# Patient Record
Sex: FEMALE | Race: WHITE | NOT HISPANIC OR LATINO | URBAN - METROPOLITAN AREA
[De-identification: names, ages, dates, MRNs, and addresses within clinical notes are randomized per-mention and may not be internally consistent; named-entity substitution may affect disease eponyms.]

---

## 2019-02-03 ENCOUNTER — EMERGENCY (EMERGENCY)
Facility: HOSPITAL | Age: 29
LOS: 1 days | Discharge: HOME | End: 2019-02-03
Attending: STUDENT IN AN ORGANIZED HEALTH CARE EDUCATION/TRAINING PROGRAM
Payer: MEDICAID

## 2019-02-03 VITALS
DIASTOLIC BLOOD PRESSURE: 70 MMHG | HEIGHT: 64 IN | SYSTOLIC BLOOD PRESSURE: 106 MMHG | WEIGHT: 134.04 LBS | HEART RATE: 84 BPM | OXYGEN SATURATION: 100 % | TEMPERATURE: 98 F | RESPIRATION RATE: 18 BRPM

## 2019-02-03 VITALS
HEART RATE: 76 BPM | DIASTOLIC BLOOD PRESSURE: 76 MMHG | RESPIRATION RATE: 20 BRPM | OXYGEN SATURATION: 97 % | TEMPERATURE: 97 F | SYSTOLIC BLOOD PRESSURE: 118 MMHG

## 2019-02-03 LAB
ALBUMIN SERPL ELPH-MCNC: 4.3 G/DL — SIGNIFICANT CHANGE UP (ref 3.3–5.2)
ALP SERPL-CCNC: 49 U/L — SIGNIFICANT CHANGE UP (ref 40–120)
ALT FLD-CCNC: 15 U/L — SIGNIFICANT CHANGE UP
AMPHET UR-MCNC: NEGATIVE — SIGNIFICANT CHANGE UP
ANION GAP SERPL CALC-SCNC: 12 MMOL/L — SIGNIFICANT CHANGE UP (ref 5–17)
APAP SERPL-MCNC: <7.5 UG/ML — LOW (ref 10–26)
APPEARANCE UR: CLEAR — SIGNIFICANT CHANGE UP
AST SERPL-CCNC: 23 U/L — SIGNIFICANT CHANGE UP
BARBITURATES UR SCN-MCNC: NEGATIVE — SIGNIFICANT CHANGE UP
BASOPHILS # BLD AUTO: 0 K/UL — SIGNIFICANT CHANGE UP (ref 0–0.2)
BASOPHILS NFR BLD AUTO: 0.2 % — SIGNIFICANT CHANGE UP (ref 0–2)
BENZODIAZ UR-MCNC: NEGATIVE — SIGNIFICANT CHANGE UP
BILIRUB SERPL-MCNC: 0.3 MG/DL — LOW (ref 0.4–2)
BILIRUB UR-MCNC: NEGATIVE — SIGNIFICANT CHANGE UP
BUN SERPL-MCNC: 12 MG/DL — SIGNIFICANT CHANGE UP (ref 8–20)
CALCIUM SERPL-MCNC: 8.9 MG/DL — SIGNIFICANT CHANGE UP (ref 8.6–10.2)
CHLORIDE SERPL-SCNC: 104 MMOL/L — SIGNIFICANT CHANGE UP (ref 98–107)
CO2 SERPL-SCNC: 24 MMOL/L — SIGNIFICANT CHANGE UP (ref 22–29)
COCAINE METAB.OTHER UR-MCNC: NEGATIVE — SIGNIFICANT CHANGE UP
COLOR SPEC: YELLOW — SIGNIFICANT CHANGE UP
CREAT SERPL-MCNC: 0.66 MG/DL — SIGNIFICANT CHANGE UP (ref 0.5–1.3)
DIFF PNL FLD: NEGATIVE — SIGNIFICANT CHANGE UP
EOSINOPHIL # BLD AUTO: 0 K/UL — SIGNIFICANT CHANGE UP (ref 0–0.5)
EOSINOPHIL NFR BLD AUTO: 0.5 % — SIGNIFICANT CHANGE UP (ref 0–6)
ETHANOL SERPL-MCNC: 110 MG/DL — SIGNIFICANT CHANGE UP
GLUCOSE SERPL-MCNC: 105 MG/DL — SIGNIFICANT CHANGE UP (ref 70–115)
GLUCOSE UR QL: NEGATIVE MG/DL — SIGNIFICANT CHANGE UP
HCG SERPL-ACNC: <4 MIU/ML — SIGNIFICANT CHANGE UP
HCT VFR BLD CALC: 38.6 % — SIGNIFICANT CHANGE UP (ref 37–47)
HGB BLD-MCNC: 12.9 G/DL — SIGNIFICANT CHANGE UP (ref 12–16)
KETONES UR-MCNC: NEGATIVE — SIGNIFICANT CHANGE UP
LEUKOCYTE ESTERASE UR-ACNC: NEGATIVE — SIGNIFICANT CHANGE UP
LYMPHOCYTES # BLD AUTO: 1.4 K/UL — SIGNIFICANT CHANGE UP (ref 1–4.8)
LYMPHOCYTES # BLD AUTO: 21.6 % — SIGNIFICANT CHANGE UP (ref 20–55)
MCHC RBC-ENTMCNC: 29.2 PG — SIGNIFICANT CHANGE UP (ref 27–31)
MCHC RBC-ENTMCNC: 33.4 G/DL — SIGNIFICANT CHANGE UP (ref 32–36)
MCV RBC AUTO: 87.3 FL — SIGNIFICANT CHANGE UP (ref 81–99)
METHADONE UR-MCNC: NEGATIVE — SIGNIFICANT CHANGE UP
MONOCYTES # BLD AUTO: 0.3 K/UL — SIGNIFICANT CHANGE UP (ref 0–0.8)
MONOCYTES NFR BLD AUTO: 5.3 % — SIGNIFICANT CHANGE UP (ref 3–10)
NEUTROPHILS # BLD AUTO: 4.5 K/UL — SIGNIFICANT CHANGE UP (ref 1.8–8)
NEUTROPHILS NFR BLD AUTO: 72.2 % — SIGNIFICANT CHANGE UP (ref 37–73)
NITRITE UR-MCNC: NEGATIVE — SIGNIFICANT CHANGE UP
OPIATES UR-MCNC: NEGATIVE — SIGNIFICANT CHANGE UP
PCP SPEC-MCNC: SIGNIFICANT CHANGE UP
PCP UR-MCNC: NEGATIVE — SIGNIFICANT CHANGE UP
PH UR: 7 — SIGNIFICANT CHANGE UP (ref 5–8)
PLATELET # BLD AUTO: 195 K/UL — SIGNIFICANT CHANGE UP (ref 150–400)
POTASSIUM SERPL-MCNC: 3.8 MMOL/L — SIGNIFICANT CHANGE UP (ref 3.5–5.3)
POTASSIUM SERPL-SCNC: 3.8 MMOL/L — SIGNIFICANT CHANGE UP (ref 3.5–5.3)
PROT SERPL-MCNC: 7 G/DL — SIGNIFICANT CHANGE UP (ref 6.6–8.7)
PROT UR-MCNC: NEGATIVE MG/DL — SIGNIFICANT CHANGE UP
RBC # BLD: 4.42 M/UL — SIGNIFICANT CHANGE UP (ref 4.4–5.2)
RBC # FLD: 12 % — SIGNIFICANT CHANGE UP (ref 11–15.6)
SALICYLATES SERPL-MCNC: <0.6 MG/DL — LOW (ref 10–20)
SODIUM SERPL-SCNC: 140 MMOL/L — SIGNIFICANT CHANGE UP (ref 135–145)
SP GR SPEC: 1 — LOW (ref 1.01–1.02)
THC UR QL: NEGATIVE — SIGNIFICANT CHANGE UP
TSH SERPL-MCNC: 0.68 UIU/ML — SIGNIFICANT CHANGE UP (ref 0.27–4.2)
UROBILINOGEN FLD QL: NEGATIVE MG/DL — SIGNIFICANT CHANGE UP
WBC # BLD: 6.2 K/UL — SIGNIFICANT CHANGE UP (ref 4.8–10.8)
WBC # FLD AUTO: 6.2 K/UL — SIGNIFICANT CHANGE UP (ref 4.8–10.8)

## 2019-02-03 PROCEDURE — 36415 COLL VENOUS BLD VENIPUNCTURE: CPT

## 2019-02-03 PROCEDURE — 80307 DRUG TEST PRSMV CHEM ANLYZR: CPT

## 2019-02-03 PROCEDURE — 80053 COMPREHEN METABOLIC PANEL: CPT

## 2019-02-03 PROCEDURE — 93010 ELECTROCARDIOGRAM REPORT: CPT

## 2019-02-03 PROCEDURE — 99284 EMERGENCY DEPT VISIT MOD MDM: CPT | Mod: 25

## 2019-02-03 PROCEDURE — 81003 URINALYSIS AUTO W/O SCOPE: CPT

## 2019-02-03 PROCEDURE — 99285 EMERGENCY DEPT VISIT HI MDM: CPT

## 2019-02-03 PROCEDURE — 84702 CHORIONIC GONADOTROPIN TEST: CPT

## 2019-02-03 PROCEDURE — 84443 ASSAY THYROID STIM HORMONE: CPT

## 2019-02-03 PROCEDURE — 93005 ELECTROCARDIOGRAM TRACING: CPT

## 2019-02-03 PROCEDURE — 85027 COMPLETE CBC AUTOMATED: CPT

## 2019-02-03 NOTE — ED PROVIDER NOTE - OBJECTIVE STATEMENT
29 y/o F, with hx of anxiety, depression, bipolar disorder, and seizures, BIBA to the ED for alcohol intoxication.  Pt states "I drank too much".  Admits to social ETOH use.  Associated sx include nausea and multiple episodes of non bilious, non bloody emesis.  Pt also states that she was crying PTA to the ED because she got into an argument with her boyfriend.  Currently on Wellbutrin.  Pt states that she would like to be evaluated by a psychiatrist due to her hx of bipolar disorder. 29 y/o F, with hx of anxiety, depression, bipolar disorder, and seizures, BIBA to the ED for alcohol intoxication.  Pt states "I drank too much".  Admits to social ETOH use.  Associated sx include nausea and multiple episodes of non bilious, non bloody emesis.  Pt also states that she was crying PTA to the ED because she got into an argument with her boyfriend.  Currently on Wellbutrin.  Pt states that she would like to be evaluated by a psychiatrist due to her hx of bipolar disorder.  Denies SI, HI, visual or auditory hallucinations. Pt denies fevers/chills, ha, loc, focal neuro deficits, cp/sob/palp, cough, abd pain/d, urinary symptoms, recent travel and sick contacts.

## 2019-02-03 NOTE — ED PROVIDER NOTE - PMH
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

## 2019-02-03 NOTE — ED PROVIDER NOTE - MEDICAL DECISION MAKING DETAILS
29 y/o F, with hx of anxiety, depression, bipolar disorder, and seizures, BIBA to the ED for alcohol intoxication. 27 y/o F, with hx of anxiety, depression, bipolar disorder, and seizures, BIBA to the ED for alcohol intoxication - pt clinically sober, no metabolic abn, stable for dc

## 2019-02-03 NOTE — ED PROVIDER NOTE - PROGRESS NOTE DETAILS
Pt reassessed and states she no longer wants to be seen evaluated by psych she has no SI/HI/VH/AH   Patient seen and reassessed.   Patient is AAOX3, NAD, able to ambulate, non tremulous, and tolerating oral intake.   VSS.   Patient states that they feel safe going home and have a safe way to get home.   Give copy of tests performed.   Discussed need to cut down on alcohol intake, given literature.

## 2019-02-03 NOTE — ED ADULT TRIAGE NOTE - CHIEF COMPLAINT QUOTE
patient arrived via ambulance - patient loud. states that she drank too much because she had too fun states that she is on wellbutrin at this time. patient states that she lives with boyfriend on weekend and that her parents don't care about her and she is in the hospital because she cannot tolerate herself when she is drinking

## 2019-02-03 NOTE — ED PROVIDER NOTE - CONSTITUTIONAL, MLM
normal... Intoxicated appearing, ALOOB, tangential at time, well nourished, awake, alert, oriented to person, place, time/situation and in no apparent distress.

## 2020-11-18 PROBLEM — F41.9 ANXIETY DISORDER, UNSPECIFIED: Chronic | Status: ACTIVE | Noted: 2019-02-03

## 2020-11-18 PROBLEM — F31.9 BIPOLAR DISORDER, UNSPECIFIED: Chronic | Status: ACTIVE | Noted: 2019-02-03

## 2020-12-29 ENCOUNTER — APPOINTMENT (OUTPATIENT)
Dept: RHEUMATOLOGY | Facility: CLINIC | Age: 30
End: 2020-12-29

## 2022-01-04 NOTE — ED PROVIDER NOTE - DISCHARGE DATE
Would you like to use one of your approval req spots on Friday, keep in mind she uses the  so a little extra time is needed usually   03-Feb-2019

## 2022-04-19 PROBLEM — Z00.00 ENCOUNTER FOR PREVENTIVE HEALTH EXAMINATION: Status: ACTIVE | Noted: 2022-04-19

## 2022-04-20 ENCOUNTER — NON-APPOINTMENT (OUTPATIENT)
Age: 32
End: 2022-04-20

## 2022-04-20 ENCOUNTER — APPOINTMENT (OUTPATIENT)
Dept: RHEUMATOLOGY | Facility: CLINIC | Age: 32
End: 2022-04-20
Payer: COMMERCIAL

## 2022-04-20 ENCOUNTER — LABORATORY RESULT (OUTPATIENT)
Age: 32
End: 2022-04-20

## 2022-04-20 VITALS
HEART RATE: 81 BPM | OXYGEN SATURATION: 98 % | WEIGHT: 165 LBS | TEMPERATURE: 97.5 F | BODY MASS INDEX: 28.17 KG/M2 | SYSTOLIC BLOOD PRESSURE: 110 MMHG | DIASTOLIC BLOOD PRESSURE: 78 MMHG | HEIGHT: 64 IN

## 2022-04-20 PROCEDURE — 99204 OFFICE O/P NEW MOD 45 MIN: CPT | Mod: 25

## 2022-04-20 NOTE — HISTORY OF PRESENT ILLNESS
[FreeTextEntry1] : 30 y/o female referred to rheumatology for leg swelling.\par Pt reports pains in the b/l ankles that shoots up to to the neck.\par Pt has Hx of L ankle fracture in childhood.\par Pt has been having b/l LE swelling worsens when she is walking for a long time or standing for a long time. Pt has feet pain with swelling with and without swelling. Swelling can last up to few weeks. Pt has flat feet. Denies other joint pains. Pt reportedly has normal kidney and liver functions.\par Pt takes Tylenol PRN and naproxen PRN for the pain which helps with pain but not with the swelling. Pt had b/l LE without blood clots.\par Reports dry eyes worse during maciel.\par Mother - RA\par \par Patient denies fatigue, fever, nasopharyngeal ulcers, chest pain, abdominal pain, palpitations, cough, SOB, nausea, vomiting, diarrhea, blood in stool, dysuria, hematuria, rash,Raynaud's, dry mouth, numbness/tingling, miscarriages (never been pregnant), Hx of DVT/PEs.\par ROS negative unless otherwise noted above.\par \par Pt has been on lamotrigene

## 2022-04-20 NOTE — ASSESSMENT
[FreeTextEntry1] : 32 y/o female referred to rheumatology for leg swelling.\par Pt reports pains in the b/l ankles that shoots up to to the neck.\par Pt has Hx of L ankle fracture in childhood.\par Pt has been having b/l LE swelling worsens when she is walking for a long time or standing for a long time. Pt has feet pain with swelling with and without swelling. Swelling can last up to few weeks. Pt has flat feet. Denies other joint pains. Pt reportedly has normal kidney and liver functions.\par Pt takes Tylenol PRN and naproxen PRN for the pain which helps with pain but not with the swelling. Pt had b/l LE without blood clots.\par Reports dry eyes worse during maciel.\par Mother - RA\par Pt has been on lamotrigene for several months as only new medication.\par \par Pt has b/l LE swelling with associated pain worse with standing and walking and which is up to her b/l knees.\par Pt's symptoms does not sound like inflammatory arthritis and much more like fluid retention with associated pain.\par Most common causes of LE swelling are cardiac, renal, liver, medication side effects. Pt does not have inflammatory arthritis or other concerning systemic symptoms and I have low suspicion for inflammatory arthritis such as RA.\par \par - Obtain labwork to evaluate for inflammatory arthritis and causes of LE fluid retention\par - Obtain XR b/l ankles and feet\par - Obtain TTE to evaluate cardiac function in setting of LE swelling\par - Advised to discuss with psychiatrist about any side effect of lamotrigene which can lead to water retention\par - Will contact pt with any concerning findings. RTC 2 months for follow up\par

## 2022-04-21 LAB
ALBUMIN SERPL ELPH-MCNC: 4.8 G/DL
ALP BLD-CCNC: 62 U/L
ALT SERPL-CCNC: 13 U/L
ANION GAP SERPL CALC-SCNC: 12 MMOL/L
AST SERPL-CCNC: 17 U/L
BASOPHILS # BLD AUTO: 0.02 K/UL
BASOPHILS NFR BLD AUTO: 0.5 %
BILIRUB SERPL-MCNC: 0.3 MG/DL
BUN SERPL-MCNC: 12 MG/DL
C3 SERPL-MCNC: 118 MG/DL
C4 SERPL-MCNC: 31 MG/DL
CALCIUM SERPL-MCNC: 9.9 MG/DL
CCP AB SER IA-ACNC: <8 UNITS
CHLORIDE SERPL-SCNC: 101 MMOL/L
CO2 SERPL-SCNC: 26 MMOL/L
CREAT SERPL-MCNC: 0.62 MG/DL
CREAT SPEC-SCNC: 41 MG/DL
CREAT/PROT UR: 0.3 RATIO
CRP SERPL-MCNC: <3 MG/L
DSDNA AB SER-ACNC: <12 IU/ML
EGFR: 122 ML/MIN/1.73M2
EOSINOPHIL # BLD AUTO: 0.09 K/UL
EOSINOPHIL NFR BLD AUTO: 2 %
ERYTHROCYTE [SEDIMENTATION RATE] IN BLOOD BY WESTERGREN METHOD: 17 MM/HR
GLUCOSE SERPL-MCNC: 92 MG/DL
HCT VFR BLD CALC: 43.8 %
HGB BLD-MCNC: 14 G/DL
IMM GRANULOCYTES NFR BLD AUTO: 0.2 %
LYMPHOCYTES # BLD AUTO: 1.15 K/UL
LYMPHOCYTES NFR BLD AUTO: 26 %
MAN DIFF?: NORMAL
MCHC RBC-ENTMCNC: 28.3 PG
MCHC RBC-ENTMCNC: 32 GM/DL
MCV RBC AUTO: 88.7 FL
MONOCYTES # BLD AUTO: 0.36 K/UL
MONOCYTES NFR BLD AUTO: 8.1 %
NEUTROPHILS # BLD AUTO: 2.79 K/UL
NEUTROPHILS NFR BLD AUTO: 63.2 %
PLATELET # BLD AUTO: 207 K/UL
POTASSIUM SERPL-SCNC: 4.8 MMOL/L
PROT SERPL-MCNC: 7.5 G/DL
PROT UR-MCNC: 13 MG/DL
RBC # BLD: 4.94 M/UL
RBC # FLD: 12.5 %
RF+CCP IGG SER-IMP: NEGATIVE
RHEUMATOID FACT SER QL: <10 IU/ML
SODIUM SERPL-SCNC: 139 MMOL/L
THYROGLOB AB SERPL-ACNC: <20 IU/ML
THYROPEROXIDASE AB SERPL IA-ACNC: <10 IU/ML
WBC # FLD AUTO: 4.42 K/UL

## 2022-04-22 LAB
APPEARANCE: CLEAR
BILIRUBIN URINE: NEGATIVE
BLOOD URINE: ABNORMAL
COLOR: NORMAL
ENA RNP AB SER IA-ACNC: <0.2 AL
ENA SM AB SER IA-ACNC: <0.2 AL
ENA SS-A AB SER IA-ACNC: <0.2 AL
ENA SS-B AB SER IA-ACNC: <0.2 AL
GLUCOSE QUALITATIVE U: NEGATIVE
KETONES URINE: NEGATIVE
LEUKOCYTE ESTERASE URINE: NEGATIVE
NITRITE URINE: NEGATIVE
PH URINE: 6
PROTEIN URINE: NEGATIVE
SPECIFIC GRAVITY URINE: 1.01
UROBILINOGEN URINE: NORMAL

## 2022-04-23 LAB — ANA SER IF-ACNC: NEGATIVE

## 2022-06-22 ENCOUNTER — APPOINTMENT (OUTPATIENT)
Dept: RHEUMATOLOGY | Facility: CLINIC | Age: 32
End: 2022-06-22

## 2022-06-22 VITALS
HEART RATE: 78 BPM | TEMPERATURE: 97.8 F | WEIGHT: 165 LBS | OXYGEN SATURATION: 98 % | SYSTOLIC BLOOD PRESSURE: 106 MMHG | DIASTOLIC BLOOD PRESSURE: 65 MMHG | BODY MASS INDEX: 28.17 KG/M2 | HEIGHT: 64 IN

## 2022-06-22 PROCEDURE — 99214 OFFICE O/P EST MOD 30 MIN: CPT

## 2022-06-27 NOTE — HISTORY OF PRESENT ILLNESS
[FreeTextEntry1] : HISTORY:\par 30 y/o female referred to rheumatology for leg swelling.\par Pt reports pains in the b/l ankles that shoots up to to the neck.\par Pt has Hx of L ankle fracture in childhood.\par Pt has been having b/l LE swelling worsens when she is walking for a long time or standing for a long time. Pt has feet pain with swelling with and without swelling. Swelling can last up to few weeks. Pt has flat feet. Denies other joint pains. Pt reportedly has normal kidney and liver functions.\par Pt takes Tylenol PRN and naproxen PRN for the pain which helps with pain but not with the swelling. Pt had b/l LE without blood clots.\par Reports dry eyes worse during maciel.\par Mother - RA\par Pt has been on lamotrigene for several months as the only new medication.\par \par INTERVAL HISTORY:\par Workup by me negative for any signs of autoimmune rheumatologic disease. XRs with soft tissue swelling. TTE and duplex negative.\par Pt states her swelling continues to be recurrent with associated pains. Denies other concerning symptoms today.\par \par INTERVAL HISTORY:\par Remarkable for (4/2022): ESR 17, UPCR 0.3\par Normal/neg (4/2022): CBC, CMP, CRP, BERTIN, dsDNA, SSA, SSB, Long, RNP, C3, C4, Thyroid Ab, RF, CCP,\par XR b/l ankles and feet (5/2022): Normal\par LE Duplex (3/2022): Normal\par TTE (5/2022): Normal

## 2022-06-27 NOTE — ASSESSMENT
[FreeTextEntry1] : 32 y/o female presents as follow up for leg swelling.\par Pt reports pains in the b/l ankles that shoots up to to the neck.\par Pt has Hx of L ankle fracture in childhood.\par Pt has been having b/l LE swelling worsens when she is walking for a long time or standing for a long time. Pt has feet pain with swelling with and without swelling. Swelling can last up to few weeks. Pt has flat feet. Denies other joint pains. Pt reportedly has normal kidney and liver functions.\par Pt takes Tylenol PRN and naproxen PRN for the pain which helps with pain but not with the swelling. Pt had b/l LE without blood clots.\par Reports dry eyes worse during maciel.\par Mother - RA\par Pt has been on lamotrigene for several months as the only new medication.\par \par Pt has b/l LE swelling with associated pain worse with standing and walking and which is up to her b/l knees.\par Pt's symptoms does not sound like inflammatory arthritis and much more like fluid retention with associated pain.\par Most common causes of LE swelling are cardiac, renal, liver, medication side effects. Pt does not have inflammatory arthritis or other concerning systemic symptoms and I have low suspicion for inflammatory arthritis such as RA.\par Workup by me negative for any signs of autoimmune rheumatologic disease and inflammatory arthritis. XRs with soft tissue swelling. TTE and duplex negative.\par \par - Obtain 24 hour urine protein for UPCR 0.3 in setting of LE swelling\par - Prescribed meloxicam 15mg PO daily PRN for joint pains (naproxen not helping pt currently)\par - Pt advised to continue follow up with PCP for further workup for LE swelling. Can consider vascular insufficiency or medication side effects as potential causes.\par - Will let pt know results of urine testing. RTC PRN.\par

## 2022-08-11 ENCOUNTER — APPOINTMENT (OUTPATIENT)
Dept: RHEUMATOLOGY | Facility: CLINIC | Age: 32
End: 2022-08-11

## 2022-08-11 VITALS
BODY MASS INDEX: 29.02 KG/M2 | DIASTOLIC BLOOD PRESSURE: 78 MMHG | OXYGEN SATURATION: 98 % | WEIGHT: 170 LBS | HEIGHT: 64 IN | SYSTOLIC BLOOD PRESSURE: 107 MMHG | HEART RATE: 104 BPM | TEMPERATURE: 97.3 F

## 2022-08-11 DIAGNOSIS — M25.572 PAIN IN RIGHT ANKLE AND JOINTS OF RIGHT FOOT: ICD-10-CM

## 2022-08-11 DIAGNOSIS — M25.571 PAIN IN RIGHT ANKLE AND JOINTS OF RIGHT FOOT: ICD-10-CM

## 2022-08-11 DIAGNOSIS — R22.43 LOCALIZED SWELLING, MASS AND LUMP, LOWER LIMB, BILATERAL: ICD-10-CM

## 2022-08-11 PROCEDURE — 99213 OFFICE O/P EST LOW 20 MIN: CPT

## 2022-08-11 NOTE — ASSESSMENT
[FreeTextEntry1] : 32 y/o female presents as follow up for leg swelling. \par Pt reports pains in the b/l ankles that shoots up to to the neck. \par Pt has Hx of L ankle fracture in childhood. \par Pt has been having b/l LE swelling worsens when she is walking for a long time or standing for a long time. Pt has feet pain with swelling with and without swelling. Swelling can last up to few weeks. Pt has flat feet. Denies other joint pains. Pt reportedly has normal kidney and liver functions. \par Pt takes Tylenol PRN and naproxen PRN for the pain which helps with pain but not with the swelling. Pt had b/l LE without blood clots. \par Reports dry eyes worse during maciel. \par Mother - RA \par Pt has been on lamotrigene for several months as the only new medication. \par \par Pt has b/l LE swelling with associated pain worse with standing and walking and which is up to her b/l knees. \par Pt's symptoms does not sound like inflammatory arthritis and much more like fluid retention with associated pain. \par Most common causes of LE swelling are cardiac, renal, liver, medication side effects. Pt does not have inflammatory arthritis or other concerning systemic symptoms and I have low suspicion for inflammatory arthritis such as RA.\par \par Workup by me negative for any signs of autoimmune rheumatologic disease and inflammatory arthritis. XRs with soft tissue swelling. TTE and duplex negative. UPCR 0.3. 24 hour urine protein was ordered but she is unable to collect urine in the collection container. Pt has been using naproxen PRN with short term relief of pain. Pt has not followed up with PCP since being referred to rheumatology for b/l LE swelling.\par \par I discussed with patient that workup and clinically picture are not consistent with underlying autoimmune disease or joint inflammation, but more consistent with fluid retention. I've advised that patient continue to follow up with her PCP for any further workup on causes of water retention.\par Given patient's insistence that I be able to find out the cause of the swelling and treat it, I offered that MR ankle would be an option to rule out synovitis (although low likelihood) and I can prescribe short term diuretics to be refilled by his PCP in the future, but patient opted to follow up with her PCP for these issues.\par \par - No signs of autoimmune rheum diseases causing pt's leg swelling. RTC PRN\par

## 2022-08-11 NOTE — HISTORY OF PRESENT ILLNESS
[FreeTextEntry1] : HISTORY: \par 32 y/o female presents as follow up for leg swelling. \par Pt reports pains in the b/l ankles that shoots up to to the neck. \par Pt has Hx of L ankle fracture in childhood. \par Pt has been having b/l LE swelling worsens when she is walking for a long time or standing for a long time. Pt has feet pain with swelling with and without swelling. Swelling can last up to few weeks. Pt has flat feet. Denies other joint pains. Pt reportedly has normal kidney and liver functions. \par Pt takes Tylenol PRN and naproxen PRN for the pain which helps with pain but not with the swelling. Pt had b/l LE without blood clots. \par Reports dry eyes worse during maciel. \par Mother - RA \par Pt has been on lamotrigene for several months as the only new medication. \par \par Pt has b/l LE swelling with associated pain worse with standing and walking and which is up to her b/l knees. \par Pt's symptoms does not sound like inflammatory arthritis and much more like fluid retention with associated pain. \par Most common causes of LE swelling are cardiac, renal, liver, medication side effects. Pt does not have inflammatory arthritis or other concerning systemic symptoms and I have low suspicion for inflammatory arthritis such as RA. \par Workup by me negative for any signs of autoimmune rheumatologic disease and inflammatory arthritis. XRs with soft tissue swelling. TTE and duplex negative. \par \par INTERVAL HISTORY:\par Pt continues to have b/l leg/feet swelling worst in AM and improved with raising feet up.\par Pt has been unable to do 24 hour urine test because she is unable to collect urine in the collection box.\par Pt is currently undergoing workup for breast masses, pending ultrasound.\par \par WORKUP: \par Remarkable for (4/2022): ESR 17, UPCR 0.3 \par Normal/neg (4/2022): CBC, CMP, CRP, BERTIN, dsDNA, SSA, SSB, Long, RNP, C3, C4, Thyroid Ab, RF, CCP, \par XR b/l ankles and feet (5/2022): Normal \par LE Duplex (3/2022): Normal \par TTE (5/2022): Normal\par

## 2022-09-24 ENCOUNTER — NON-APPOINTMENT (OUTPATIENT)
Age: 32
End: 2022-09-24

## 2022-10-02 ENCOUNTER — RX RENEWAL (OUTPATIENT)
Age: 32
End: 2022-10-02

## 2022-10-02 RX ORDER — MELOXICAM 15 MG/1
15 TABLET ORAL
Qty: 30 | Refills: 2 | Status: ACTIVE | COMMUNITY
Start: 2022-06-22 | End: 1900-01-01

## 2022-10-11 ENCOUNTER — APPOINTMENT (OUTPATIENT)
Dept: NEUROLOGY | Facility: CLINIC | Age: 32
End: 2022-10-11

## 2022-10-11 VITALS
SYSTOLIC BLOOD PRESSURE: 130 MMHG | OXYGEN SATURATION: 99 % | DIASTOLIC BLOOD PRESSURE: 80 MMHG | HEART RATE: 93 BPM | HEIGHT: 64 IN | BODY MASS INDEX: 29.02 KG/M2 | WEIGHT: 170 LBS

## 2022-10-11 DIAGNOSIS — R56.00 SIMPLE FEBRILE CONVULSIONS: ICD-10-CM

## 2022-10-11 DIAGNOSIS — G43.009 MIGRAINE W/OUT AURA, NOT INTRACTABLE, W/OUT STATUS MIGRAINOSUS: ICD-10-CM

## 2022-10-11 DIAGNOSIS — G40.409 OTHER GENERALIZED EPILEPSY AND EPILEPTIC SYNDROMES, NOT INTRACTABLE, W/OUT STATUS EPILEPTICUS: ICD-10-CM

## 2022-10-11 DIAGNOSIS — F31.9 BIPOLAR DISORDER, UNSPECIFIED: ICD-10-CM

## 2022-10-11 DIAGNOSIS — Z84.89 FAMILY HISTORY OF OTHER SPECIFIED CONDITIONS: ICD-10-CM

## 2022-10-11 DIAGNOSIS — Z86.69 PERSONAL HISTORY OF OTHER DISEASES OF THE NERVOUS SYSTEM AND SENSE ORGANS: ICD-10-CM

## 2022-10-11 PROCEDURE — 99204 OFFICE O/P NEW MOD 45 MIN: CPT

## 2022-10-11 RX ORDER — BUPROPION HYDROCHLORIDE 75 MG/1
75 TABLET, FILM COATED ORAL DAILY
Qty: 30 | Refills: 0 | Status: ACTIVE | COMMUNITY
Start: 2022-10-11

## 2022-10-11 RX ORDER — RIBOFLAVIN (VITAMIN B2) 400 MG
400 TABLET ORAL
Qty: 30 | Refills: 2 | Status: ACTIVE | COMMUNITY
Start: 2022-10-11 | End: 1900-01-01

## 2022-10-11 RX ORDER — SUMATRIPTAN 50 MG/1
TABLET, FILM COATED ORAL
Refills: 0 | Status: ACTIVE | COMMUNITY

## 2022-10-11 RX ORDER — LAMOTRIGINE 150 MG/1
TABLET ORAL
Refills: 0 | Status: ACTIVE | COMMUNITY

## 2022-10-11 NOTE — ASSESSMENT
[FreeTextEntry1] : The patient is a 30 yo lady who has a history of febrile seizure and epilepsy at age 2-13 yo and migraine headache.\par  Currently, she is suffering from headache that is refractory to sumatriptan, lamotrigine and she was recently started on Wellbutrin. \par She reported having "[place]" episodes associated with stuttering speech that worrying her if her epilepsy has recurred. I suspect that these are related to migraine and not an epileptic event. \par Due to a poor control HA and these episodes, I recommend a MRI brain epilepsy protocol and EEG study. She agreed. \par I am prescribing Riboflavin 400 mg daily for migraine preventive Rx and recommend that she start on a headache diary. She agreed.\par I will see her for a f/u in 6-8 weeks. \par \par \par \par

## 2022-10-11 NOTE — PHYSICAL EXAM
[FreeTextEntry1] : GENERAL APPEARANCE:\par In no acute distress. Alert & oriented. Behavior and affect appropriate to situation.\par SKIN:\par Warm and dry. No rash or suspicious lesions noted. Skin turgor - normal for age.\par HEENT:\par Neck supple. No thyromegaly. No lymphadenopathy.\par CARDIOVASCULAR:\par Regular rate and rhythm.\par \par NEUROLOGIC EXAM:\par MENTAL STATUS:\par Oriented to person, place and time.\par Speech is fluent, without dysarthria or aphasia.\par Recent and remote memory are intact.\par Attention span and concentration are normal.\par Adequate historian with normal fund of knowledge.\par Moods are consistent with situation, affect is euthymic.\par Thought process is coherent, content is without delusions or hallucinations.\par FUNDUSCOPIC:\par No acute abnormality\par CRANIAL NERVES:\par CN 2: Visual fields are full to confrontation.\par CN 3, 4, 6: Extraocular movements are intact. No nystagmus or ophthalmoplegia is evident. Pupils are equally round and reactive to light and accommodation.\par CN 5: Facial sensation is intact in all 3 divisions.\par CN 7: Facial excursion - full and symmetric\par CN 8: Hearing is intact.\par CN 9,10,12: The palate elevates symmetrically to phonation. No dysphonia is noted. Tongue, uvula and palate are midline.\par CN 11: Shoulders shrug symmetrically. Sternocleidomastoids full strength bilaterally.\par MOTOR:\par Strength is 5/5 throughout for age and stature. Tone and bulk are normal, without gross atrophy.\par There are voluntary random jerking movements observed without loss of consciousness during the encounter.\par \par SENSORY:\par Intact to light touch, temperature, sharp and vibratory perception in all four extremities without side to side asymmetry or proximal to distal gradient.\par REFLEXES:\par Biceps 2+ (R)  2+ (L)\par Triceps 2+ (R)  2+ (L)\par Brachiorad 2+ (R)  2+ (L)\par Patellar 2+ (R)  2+ (L)\par Achilles 1+ (R)  1+ (L)\par Plantar down down\par \par COORDINATION:\par Finger to nose and heel to shin testing without dysmetria or dysdiadochokinesia. Rapid alternating movements normal.\par GAIT:\par Able to walk without difficulty, normal Romberg and tandem walk.\par

## 2022-10-11 NOTE — HISTORY OF PRESENT ILLNESS
[FreeTextEntry1] : The patient is a 31 years old lady who was diagnosed with epilepsy from age [age]. She could not give me details of her seizure. She was on "neurontin" during that time. \par In addition to seizure, she has had migraine headache. The pain is located in the left>right (80:20) side of her head and electric shock like lasted 2 days at times. The patient have 2 weeks of headache per month. She took lamotrigine for her baseline bipolar disorder and she was just started Wellbutrin 75 mg nightly.\par Over the past 5-10 years, she has the "zap" feeling in her head followed by a whole body tingling sensation. During the time, her speech was stuttering but she did not lose her conscious and could comprehend. \par The patient is concerned that this episode could be epileptic in nature.\par There is no tongue biting or incontinence reported.\par \par Epilepsy risk factors (including gestational/birth history):\par The patient was the product of a normal spontaneous vaginal delivery of a full term birth. There was no history of febrile convulsions or CNS infections.\par Development was normal and developmental milestones were up to par with age.\par No history of head trauma with loss of consciousness.\par

## 2022-10-18 ENCOUNTER — APPOINTMENT (OUTPATIENT)
Dept: NEUROLOGY | Facility: CLINIC | Age: 32
End: 2022-10-18
Payer: MEDICAID

## 2022-10-18 ENCOUNTER — APPOINTMENT (OUTPATIENT)
Dept: NEUROLOGY | Facility: CLINIC | Age: 32
End: 2022-10-18

## 2022-10-18 PROCEDURE — 93040 RHYTHM ECG WITH REPORT: CPT

## 2022-10-18 PROCEDURE — 95816 EEG AWAKE AND DROWSY: CPT

## 2022-12-01 ENCOUNTER — NON-APPOINTMENT (OUTPATIENT)
Age: 32
End: 2022-12-01

## 2024-03-27 ENCOUNTER — EMERGENCY (EMERGENCY)
Facility: HOSPITAL | Age: 34
LOS: 1 days | Discharge: HOME | End: 2024-03-27
Attending: STUDENT IN AN ORGANIZED HEALTH CARE EDUCATION/TRAINING PROGRAM
Payer: MEDICAID

## 2024-03-27 VITALS
RESPIRATION RATE: 18 BRPM | SYSTOLIC BLOOD PRESSURE: 109 MMHG | HEART RATE: 73 BPM | OXYGEN SATURATION: 100 % | TEMPERATURE: 97 F | DIASTOLIC BLOOD PRESSURE: 74 MMHG

## 2024-03-27 PROCEDURE — 99283 EMERGENCY DEPT VISIT LOW MDM: CPT

## 2024-03-27 PROCEDURE — 99282 EMERGENCY DEPT VISIT SF MDM: CPT

## 2024-03-27 RX ORDER — DIPHENHYDRAMINE HCL 50 MG
50 CAPSULE ORAL ONCE
Refills: 0 | Status: DISCONTINUED | OUTPATIENT
Start: 2024-03-27 | End: 2024-03-27

## 2024-03-27 RX ORDER — DIPHENHYDRAMINE HCL 50 MG
50 CAPSULE ORAL ONCE
Refills: 0 | Status: COMPLETED | OUTPATIENT
Start: 2024-03-27 | End: 2024-03-27

## 2024-03-27 RX ADMIN — Medication 50 MILLIGRAM(S): at 05:03

## 2024-03-27 NOTE — ED ADULT TRIAGE NOTE - CHIEF COMPLAINT QUOTE
Pt BIBEMS for adverse reaction to new medication. Took first dose of Lybalvi (olanzapine & samidorphan) for sunil at 10pm last night. States she woke up in the middle of the night restless, dizzy, twitching. States "Its making me feel like im drunk".

## 2024-03-27 NOTE — ED PROVIDER NOTE - CLINICAL SUMMARY MEDICAL DECISION MAKING FREE TEXT BOX
Pt w/ hx bipolar, presents for sensation of restless/dizziness after starting new medication (olanzapine/samidorphan). Pt very anxious but with stable VS and nonfocal neuro exam. Likely medication side effect. Treated with benadryl (pt says she cannot tolerate benztropine because it has caused hypotension in the past). I discussed at length with pt about my low clinical suspicion for stroke/seizure given normal neuro exam, and that there is no indication for further workup at this time. I advised her to discontinue new medication and to f/u with her psychiatrist. Medically stable for discharge.

## 2024-03-27 NOTE — ED PROVIDER NOTE - OBJECTIVE STATEMENT
A 32 yo F presents to the ED for adverse reaction after taking new medication, Lybalvi (olanzapine & samidorphan) for sunil, at 10pm last night. States she woke up with restlessness, anxious, dizzy, twitching, and nausea. Denies fall, LOC, or trauma.

## 2024-03-27 NOTE — ED PROVIDER NOTE - PATIENT PORTAL LINK FT
You can access the FollowMyHealth Patient Portal offered by [facility] by registering at the following website: [email or web address]. By joining [facility]’s FollowMyHealth portal, you will also be able to view your health information using other applications (apps) compatible with our system.

## 2024-03-27 NOTE — ED PROVIDER NOTE - ATTENDING CONTRIBUTION TO CARE
28y F w/ hx anxiety, depression, bipolar disorder, and seizures; presents for medical evaluation. Pt says she was just started on new medication for bipolar (Lybalvi -- olanzapine/samidorphan). Took first dose last night; now complains of feeling very restless and jittery. Says she feels dizzy. Expresses concern that she may be having a seizure or stroke. Denies any LOC or fall. On exam, pt very anxious but in no acute distress, AAOx4. No focal neuro deficits. Ambulatory with steady gait, stable VS. I suspect EPS symptoms 2/2 antipsychotic use. Will treat with benadryl, reassess.

## 2024-03-27 NOTE — ED PROVIDER NOTE - PHYSICAL EXAMINATION
Const: Awake, alert and oriented. Restlessness.  HEENT: NC/AT. Moist mucous membranes.  Eyes: No scleral icterus. EOMI.  Neck:. Soft and supple. Full ROM without pain.  Cardiac: Regular rate and rhythm. +S1/S2. No murmurs. Peripheral pulses 2+ and symmetric. No LE edema.  Resp: Speaking in full sentences. No evidence of respiratory distress. No wheezes, rales or rhonchi.  Abd: Soft, non-tender, non-distended. Normal bowel sounds in all 4 quadrants. No guarding or rebound.  Back: Spine midline and non-tender. No CVAT.  Neuro: +Pressured speech, +eyes twitching, Hands twitching. No gross neuro deficits, moving all extremities.  Skin: No rashes, abrasions or lacerations.

## 2024-03-27 NOTE — ED PROVIDER NOTE - NSICDXPASTMEDICALHX_GEN_ALL_CORE_FT
PAST MEDICAL HISTORY:  Anxiety     Bipolar disorder     Depression (emotion)     H/o of Grand Mal Seizure age 2-5 years old    Seizure

## 2024-03-27 NOTE — ED ADULT NURSE NOTE - OBJECTIVE STATEMENT
PT c/o L sided CP radiating up L side of neck. Pt states pain woke her up from her sleep and she believes its an adverse effect after taking new medications. PMH of anxiety, depression, bipolar, epilepsy. Pt denies any SOB, HA, dizziness, vision changes, abd pain, NVD, fever/chills, cough, and urinary symptoms at this time. Resp even and unlabored. NAD present.

## 2024-03-27 NOTE — ED PROVIDER NOTE - NSFOLLOWUPINSTRUCTIONS_ED_ALL_ED_FT
- Stop taking Lybalvi.  - You may take benadryl 50 mg every 6 hours as needed.  - Follow up with your psychiatrist.  - Return to the emergency room for any new or worsening symptoms.    Extrapyramidal Symptoms    WHAT YOU NEED TO KNOW:    What are extrapyramidal symptoms? Extrapyramidal symptoms (EPS) are side effects of antipsychotic medicines. EPS can cause movement and muscle control problems throughout your body.    What symptoms may I have? Symptoms may be noticed after you take one dose of medicine or after long-term use. You may not be aware of these symptoms, but others close to you may notice any of the following:    Restlessness and nervous energy shown by pacing, marching, shuffling, or foot-tapping    Severe, uncontrolled muscle contractions of your head, neck, trunk, and limbs that cause stiff tongue, twisted neck, or back arching    Tremors, stiff posture, or no arm movement when you walk    Uncontrolled movements of your tongue, jaw, lips, or face, such as pursing, chewing, or frequent eye blinking    Uncontrolled movements of your fingers or toes, head nodding, or pelvic thrusting    Fast, irregular breathing with grunts, gasping, or sighing    Weak voice, drooling, or little or no facial expression  What should I do if I or someone close to me notices that I have symptoms?    Do not stop taking your medicines unless your healthcare provider says it is okay.    Contact your healthcare provider.    Take a list of your medicines with you to your appointment.  CARE AGREEMENT:    You have the right to help plan your care. Learn about your health condition and how it may be treated. Discuss treatment options with your healthcare providers to decide what care you want to receive. You always have the right to refuse treatment.

## 2024-03-29 DIAGNOSIS — F31.9 BIPOLAR DISORDER, UNSPECIFIED: ICD-10-CM

## 2024-03-29 DIAGNOSIS — R42 DIZZINESS AND GIDDINESS: ICD-10-CM

## 2024-03-29 DIAGNOSIS — F41.9 ANXIETY DISORDER, UNSPECIFIED: ICD-10-CM

## 2024-03-29 DIAGNOSIS — T50.905A ADVERSE EFFECT OF UNSPECIFIED DRUGS, MEDICAMENTS AND BIOLOGICAL SUBSTANCES, INITIAL ENCOUNTER: ICD-10-CM
